# Patient Record
Sex: FEMALE | URBAN - METROPOLITAN AREA
[De-identification: names, ages, dates, MRNs, and addresses within clinical notes are randomized per-mention and may not be internally consistent; named-entity substitution may affect disease eponyms.]

---

## 2022-08-12 ENCOUNTER — OFFICE VISIT (OUTPATIENT)
Dept: URBAN - METROPOLITAN AREA CLINIC 31 | Facility: CLINIC | Age: 42
End: 2022-08-12

## 2022-09-14 ENCOUNTER — OFFICE VISIT (OUTPATIENT)
Dept: URBAN - METROPOLITAN AREA CLINIC 35 | Facility: CLINIC | Age: 42
End: 2022-09-14
Payer: COMMERCIAL

## 2022-09-14 ENCOUNTER — WEB ENCOUNTER (OUTPATIENT)
Dept: URBAN - METROPOLITAN AREA CLINIC 35 | Facility: CLINIC | Age: 42
End: 2022-09-14

## 2022-09-14 ENCOUNTER — LAB OUTSIDE AN ENCOUNTER (OUTPATIENT)
Dept: URBAN - METROPOLITAN AREA CLINIC 35 | Facility: CLINIC | Age: 42
End: 2022-09-14

## 2022-09-14 VITALS
HEART RATE: 72 BPM | DIASTOLIC BLOOD PRESSURE: 76 MMHG | HEIGHT: 65 IN | OXYGEN SATURATION: 98 % | BODY MASS INDEX: 28.66 KG/M2 | SYSTOLIC BLOOD PRESSURE: 125 MMHG | WEIGHT: 172 LBS

## 2022-09-14 DIAGNOSIS — R14.0 ABDOMINAL BLOATING: ICD-10-CM

## 2022-09-14 DIAGNOSIS — R12 HEARTBURN: ICD-10-CM

## 2022-09-14 DIAGNOSIS — R07.89 ATYPICAL CHEST PAIN: ICD-10-CM

## 2022-09-14 PROCEDURE — 99244 OFF/OP CNSLTJ NEW/EST MOD 40: CPT | Performed by: INTERNAL MEDICINE

## 2022-09-14 PROCEDURE — 91065 BREATH HYDROGEN/METHANE TEST: CPT | Performed by: INTERNAL MEDICINE

## 2022-09-14 PROCEDURE — 99204 OFFICE O/P NEW MOD 45 MIN: CPT | Performed by: INTERNAL MEDICINE

## 2022-09-14 RX ORDER — PANTOPRAZOLE SODIUM 20 MG/1
1 TABLET TABLET, DELAYED RELEASE ORAL
Qty: 90 | Refills: 0 | OUTPATIENT
Start: 2022-09-14

## 2022-09-14 NOTE — HPI-TODAY'S VISIT:
42 Year old female patient presents today for a consultation about bloating. Bloating started about a year ago and occurs after meals and sometimes without provocation. She admits episodes occur weekly. Patient admits episodes of gas, nausea, emesis and acid reflux. She states gas is passable. Patient admits taking OTC TUMS with relief of reflux symptoms. Patient currently admits 1-2 bowel movements per a day with occasional strain. Patient admits strain depends on foods she eats. She denies any mucus, melena, pruritus ani,  or abdominal cramping/pain. Admits occasional rectal pain.  GI MD: No constipation. Patient is vegetarian. No change in diet. Fermented food makes it worse. No new meds or supplements. There is diffuse ABD pain and also increased heartburn which is happening often. No dysphagia. No N/V Occasional heavy sensation in chest/chest pain. No SOB. No weight loss

## 2022-10-13 ENCOUNTER — OFFICE VISIT (OUTPATIENT)
Dept: URBAN - METROPOLITAN AREA SURGERY CENTER 8 | Facility: SURGERY CENTER | Age: 42
End: 2022-10-13

## 2022-10-17 ENCOUNTER — CLAIMS CREATED FROM THE CLAIM WINDOW (OUTPATIENT)
Dept: URBAN - METROPOLITAN AREA CLINIC 4 | Facility: CLINIC | Age: 42
End: 2022-10-17
Payer: COMMERCIAL

## 2022-10-17 ENCOUNTER — CLAIMS CREATED FROM THE CLAIM WINDOW (OUTPATIENT)
Dept: URBAN - METROPOLITAN AREA SURGERY CENTER 8 | Facility: SURGERY CENTER | Age: 42
End: 2022-10-17

## 2022-10-17 ENCOUNTER — CLAIMS CREATED FROM THE CLAIM WINDOW (OUTPATIENT)
Dept: URBAN - METROPOLITAN AREA SURGERY CENTER 8 | Facility: SURGERY CENTER | Age: 42
End: 2022-10-17
Payer: COMMERCIAL

## 2022-10-17 DIAGNOSIS — R12 BURNING REFLUX: ICD-10-CM

## 2022-10-17 DIAGNOSIS — K31.89 OTHER DISEASES OF STOMACH AND DUODENUM: ICD-10-CM

## 2022-10-17 DIAGNOSIS — K31.89 ACQUIRED DEFORMITY OF DUODENUM: ICD-10-CM

## 2022-10-17 DIAGNOSIS — K21.9 ACID REFLUX: ICD-10-CM

## 2022-10-17 DIAGNOSIS — R14.0 ABDOMINAL BLOATING: ICD-10-CM

## 2022-10-17 PROCEDURE — 88305 TISSUE EXAM BY PATHOLOGIST: CPT | Performed by: PATHOLOGY

## 2022-10-17 PROCEDURE — G8907 PT DOC NO EVENTS ON DISCHARG: HCPCS | Performed by: INTERNAL MEDICINE

## 2022-10-17 PROCEDURE — 88312 SPECIAL STAINS GROUP 1: CPT | Performed by: PATHOLOGY

## 2022-10-17 PROCEDURE — 43239 EGD BIOPSY SINGLE/MULTIPLE: CPT | Performed by: INTERNAL MEDICINE

## 2022-11-02 ENCOUNTER — TELEPHONE ENCOUNTER (OUTPATIENT)
Dept: URBAN - METROPOLITAN AREA CLINIC 36 | Facility: CLINIC | Age: 42
End: 2022-11-02

## 2022-11-03 ENCOUNTER — OFFICE VISIT (OUTPATIENT)
Dept: URBAN - METROPOLITAN AREA CLINIC 33 | Facility: CLINIC | Age: 42
End: 2022-11-03

## 2022-11-03 NOTE — HPI-TODAY'S VISIT:
42 Year old female patient presents today for follow after EGD. Patient admits/ denies complications after procedure. Since the procedure patient _______ dysphagia, globus, changes in appetite, and changes in bowel habits.   EGD Impression: - Z-line regular, 35 cm from the incisors. - Normal esophagus.  Biopsied. - Erythematous mucosa in the antrum.  Biopsied. - Gastric erosions with no stigmata of recent bleeding. - Normal examined duodenum.  Biopsied.

## 2022-11-17 ENCOUNTER — DASHBOARD ENCOUNTERS (OUTPATIENT)
Age: 42
End: 2022-11-17

## 2022-11-17 ENCOUNTER — OFFICE VISIT (OUTPATIENT)
Dept: URBAN - METROPOLITAN AREA CLINIC 33 | Facility: CLINIC | Age: 42
End: 2022-11-17
Payer: COMMERCIAL

## 2022-11-17 VITALS
WEIGHT: 160 LBS | OXYGEN SATURATION: 99 % | BODY MASS INDEX: 26.66 KG/M2 | HEIGHT: 65 IN | HEART RATE: 64 BPM | SYSTOLIC BLOOD PRESSURE: 108 MMHG | DIASTOLIC BLOOD PRESSURE: 64 MMHG

## 2022-11-17 DIAGNOSIS — R14.0 ABDOMINAL BLOATING: ICD-10-CM

## 2022-11-17 DIAGNOSIS — R07.89 ATYPICAL CHEST PAIN: ICD-10-CM

## 2022-11-17 DIAGNOSIS — K21.9 GASTROESOPHAGEAL REFLUX DISEASE WITHOUT ESOPHAGITIS: ICD-10-CM

## 2022-11-17 PROBLEM — 266435005: Status: ACTIVE | Noted: 2022-11-17

## 2022-11-17 PROCEDURE — 99214 OFFICE O/P EST MOD 30 MIN: CPT | Performed by: INTERNAL MEDICINE

## 2022-11-17 RX ORDER — PANTOPRAZOLE SODIUM 20 MG/1
1 TABLET TABLET, DELAYED RELEASE ORAL
Qty: 90 | Refills: 0 | OUTPATIENT
Start: 2022-11-17

## 2022-11-17 RX ORDER — PANTOPRAZOLE SODIUM 20 MG/1
1 TABLET TABLET, DELAYED RELEASE ORAL
Qty: 90 | Refills: 0 | Status: ACTIVE | COMMUNITY
Start: 2022-09-14

## 2022-11-17 NOTE — HPI-TODAY'S VISIT:
42-Year-old female patient presents today for follow up after her EGD. Patient denies complications after procedure. Since the procedure patient denies any dysphagia, globus, changes in appetite, and changes in bowel habits. She sates that she does have acid reflux intermittently and sometimes after certain foods that she eats. Water is an alleviating factor, however other liquids are aggravating factors. She states that she takes OTC (Tums) for relief. EGD Impression: - Z-line regular, 35 cm from the incisors. - Normal esophagus. Biopsied. - Erythematous mucosa in the antrum. Biopsied. - Gastric erosions with no stigmata of recent bleeding. - Normal examined duodenum. Biopsied  Pathology:  a- Duodenum, Second Part (D2), Biopsy: NO SIGNIFICANT ABNORMALITY. B- Stomach, Antrum, Biopsy: Foveolar HYPERPLASIA,  No Evidence of H. Pylori Organisms or Intestinal Metaplasia. Negative for Dysplasia or Malignancy. C- Esophagus, Lower-Third, Biopsy: SQUAMOUS MUCOSA WITH REFLUX-TYPE CHANGES; NO COLUMNAR MUCOSA IDENTIFIED. No evidence of Nevarez's Esophagus or Eosinophilic Esophagitis. Negative for Infectious organisms, dysphasia or malignancy.

## 2023-01-26 ENCOUNTER — OFFICE VISIT (OUTPATIENT)
Dept: URBAN - METROPOLITAN AREA CLINIC 33 | Facility: CLINIC | Age: 43
End: 2023-01-26

## 2023-01-26 RX ORDER — PANTOPRAZOLE SODIUM 20 MG/1
1 TABLET TABLET, DELAYED RELEASE ORAL
Qty: 90 | Refills: 0 | Status: ACTIVE | COMMUNITY
Start: 2022-09-14

## 2023-01-26 RX ORDER — PANTOPRAZOLE SODIUM 20 MG/1
1 TABLET TABLET, DELAYED RELEASE ORAL
Qty: 90 | Refills: 0 | Status: ACTIVE | COMMUNITY
Start: 2022-11-17

## 2023-01-26 NOTE — HPI-TODAY'S VISIT:
42 Year old female patient presents today for f/u. Patient admits/ denies improvement with the continued use of Pantoprazole 20 mg with/out improvement of symptoms. Currently admits ... bm a day with/out strain. Stools are ... Denies/admits the presence of blood, mucus, nor melena.  Admits/denies completing SIBO Breath Test revealing NL/Negative results.